# Patient Record
(demographics unavailable — no encounter records)

---

## 2024-10-09 NOTE — REVIEW OF SYSTEMS
[Fever] : no fever [Chills] : no chills [Feeling Poorly] : not feeling poorly [Feeling Tired] : not feeling tired [Eyesight Problems] : no eyesight problems [Discharge From Eyes] : no purulent discharge from the eyes [Nosebleeds] : no nosebleeds [Chest Pain] : no chest pain [Lower Ext Edema] : no extremity edema [Shortness Of Breath] : no shortness of breath [Wheezing] : no wheezing [Abdominal Pain] : no abdominal pain [Vomiting] : no vomiting [As Noted in HPI] : as noted in HPI [Itching] : no itching [Change In A Mole] : no change in a mole [Dizziness] : no dizziness [Fainting] : no fainting [Anxiety] : no anxiety [Depression] : no depression [Easy Bleeding] : no tendency for easy bleeding [Easy Bruising] : no tendency for easy bruising

## 2024-10-09 NOTE — ASSESSMENT
[FreeTextEntry1] : 82 years old man here for evaluation of an elevated creatinine.    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  There are no obstructive symptoms, no history of dm, nor uncontrolled hypertension.  There is no evidence of nephritis on urine analysis.  Vascular disease and prior contrast exposure with prior NSAIDS may be contributing.  Regardless his creatinine is stable at 1.4 in May 2024. Has some albuminuria and has a low KFRE we tried ARB but he did not tolerate it.   We spent most of the visit discussing chronic kidney disease, its stages, risk for progression and strategies for prevention including avoidance of NSAIDs and notifying me of medication changes.   Repeat urine tests today.  If stable follow up with me again six months  Hypertension -- The patient's blood pressure is at goal today. Continue present medications and monitor.  The time spent is inclusive of the time it took to see, evaluate, and manage the patient.  Time is inclusive of the time taken to review chart, reconcile medications, document findings, and communicate with other providers (when applicable).

## 2024-10-09 NOTE — HISTORY OF PRESENT ILLNESS
[FreeTextEntry1] : Today I had the pleasure of meeting Shayan Mills who is an semi retired .  He is originally from [place] but raise his children in [place].  His medical history has included a melanoma which was diagnosed at a young age, an irregular heart rhythm after a bout with coxsackie.  He was recently having some difficulty with fatigue and had a cardiac catheterization a few weeks back.  He is here for evaluation of an elevated creatinine.  He has no symptoms of urinary obstruction, no chest pain no shortness of breath, no hematuria.  No family history of kidney disease.  He does endorse a history of heavy NSAID use for muscle pain in the past.  7/22/21 - I saw and evaluated Shayan today.  He recently had an episode of HTN, fatigue, chills fever - covid was negative saw internist and self-resolved.  Creatinine with internist was 1.33 in June this year.  Patient feels well and denies complaints today.  12/13/21 -- Upon evaluation today Shayan feels well and denies complaints.  He has not had any difficulty with chest pain or shortness of breath.  Urination is not a problem.  Since our last visit no new meidcations and no recent blood work.  11/11/22 -- Upon evaluation today shayan feels well denies complaints.  has good energy level and no urinary complaints.  6/12/23 -- Patient feels well denies complaints.  just returned from 10 days in [place].   3/20/24 -- Doing well denies complaints no chest pain no difficulty breathing.  Having some MSK issues.  Avoiding NSAIDS uses topical salicylate which helps.  10/9/24 -- Doing well.  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  Patient has had slow urinary stream saw urology.  otherwise feels good.

## 2024-10-09 NOTE — PHYSICAL EXAM
[General Appearance - Alert] : alert [General Appearance - In No Acute Distress] : in no acute distress [General Appearance - Well Nourished] : well nourished [Sclera] : the sclera and conjunctiva were normal [Hearing Threshold Finger Rub Not [place]] : hearing was normal [Neck Appearance] : the appearance of the neck was normal [Jugular Venous Distention Increased] : there was no jugular-venous distention [] : no respiratory distress [Respiration, Rhythm And Depth] : normal respiratory rhythm and effort [Exaggerated Use Of Accessory Muscles For Inspiration] : no accessory muscle use [Auscultation Breath Sounds / Voice Sounds] : lungs were clear to auscultation bilaterally [Heart Sounds] : normal S1 and S2 [Heart Sounds Gallop] : no gallops [Murmurs] : no murmurs [Heart Sounds Pericardial Friction Rub] : no pericardial rub [Edema] : there was no peripheral edema [No CVA Tenderness] : no ~M costovertebral angle tenderness [Abnormal Walk] : normal gait [Oriented To Time, Place, And Person] : oriented to person, place, and time [Impaired Insight] : insight and judgment were intact [Affect] : the affect was normal [Mood] : the mood was normal

## 2024-10-30 NOTE — HISTORY OF PRESENT ILLNESS
[FreeTextEntry1] : 9/4/24 - This 83 yo male presents with c/o hesitancy and weakened FOS. He also informs of daytime dribbling - throughout the day - minimal amount daily. Denies enuresis.  He has noticed these changes just over the last 2-3 months. Denes any pelvic trauma.  He informs that he can hold his urine for 2-3 hours - no urgency. He has never had a full incontinence experience. He does feel as though there is not complete bladder emptying and sometimes, he double voids. Nocturia is 1-2x - informs that 2x is max. Daytime urination is every 3-4 hours - depends on water intake.  Informs that for the last 2-3 months he has also had a slight change in getting and maintaining an erection despite taking 100mg of Sildenafil.  Last urination - 2 hours ago Pre-urination scan: >123 mls PVR: 75ml PFR 13.2 on a voided vol. of 51ml  PMH: Renal insufficiency - sees nephrologist regularly  GFR 47 and CR 1.49 from May 2024.   PSH: Green light laser prostate procedure - 28 years ago - states was told his prostate was too large- does not remember urinary issues at that time and never maintained on any meds  10/30/24 - Returns now informing that double urination is still happening and that he is still experiencing minor overnight dribbling and that this moisture is irritating his inner left groin area. He denies redness or itching of the area and informs that the irritation is alleviating after he applies Vaseline.   He is maintained on Tadalafil 5 mg. which he only started on October 10th. Nocturia is x2 and daytime is 4-5. He drank 2 liters and coffee before this visit.   Pre-urination scan: 299ml PFR: 12.1 Voided Vol: 225ml PVR: 12.1

## 2025-04-10 NOTE — PHYSICAL EXAM
[General Appearance - Alert] : alert [General Appearance - In No Acute Distress] : in no acute distress [General Appearance - Well Nourished] : well nourished [Sclera] : the sclera and conjunctiva were normal [Hearing Threshold Finger Rub Not [place]] : hearing was normal [Neck Appearance] : the appearance of the neck was normal [Jugular Venous Distention Increased] : there was no jugular-venous distention [] : no respiratory distress [Respiration, Rhythm And Depth] : normal respiratory rhythm and effort [Exaggerated Use Of Accessory Muscles For Inspiration] : no accessory muscle use [Auscultation Breath Sounds / Voice Sounds] : lungs were clear to auscultation bilaterally [Heart Sounds] : normal S1 and S2 [Heart Sounds Gallop] : no gallops [Murmurs] : no murmurs [Heart Sounds Pericardial Friction Rub] : no pericardial rub [Edema] : there was no peripheral edema [No CVA Tenderness] : no ~M costovertebral angle tenderness [Abnormal Walk] : normal gait [Oriented To Time, Place, And Person] : oriented to person, place, and time [Impaired Insight] : insight and judgment were intact [Affect] : the affect was normal [Mood] : the mood was normal

## 2025-04-10 NOTE — ASSESSMENT
[FreeTextEntry1] : 82 years old man here for evaluation of an elevated creatinine.    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  There are no obstructive symptoms, no history of dm, nor uncontrolled hypertension.  There is no evidence of nephritis on urine analysis.  Vascular disease and prior contrast exposure with prior NSAIDS may be contributing.  Regardless his creatinine is stable at 1.3 on recent blood work (done at outside lab will scan in). Has some albuminuria and has a low KFRE we tried ARB but he did not tolerate it.   We spent most of the visit discussing chronic kidney disease, its stages, risk for progression and strategies for prevention including avoidance of NSAIDs and notifying me of medication changes.   Six months follow up.  Hypertension -- Long discussion today about BP, BP goals and the importance of maintaining those goals for CV risk reduction.    The time spent is inclusive of the time it took to see, evaluate, and manage the patient.  Time is inclusive of the time taken to review chart, reconcile medications, document findings, and communicate with other providers (when applicable).

## 2025-04-10 NOTE — HISTORY OF PRESENT ILLNESS
[FreeTextEntry1] : Today I had the pleasure of meeting Shayan Mills who is an semi retired .  He is originally from [place] but raise his children in [place].  His medical history has included a melanoma which was diagnosed at a young age, an irregular heart rhythm after a bout with coxsackie.  He was recently having some difficulty with fatigue and had a cardiac catheterization a few weeks back.  He is here for evaluation of an elevated creatinine.  He has no symptoms of urinary obstruction, no chest pain no shortness of breath, no hematuria.  No family history of kidney disease.  He does endorse a history of heavy NSAID use for muscle pain in the past.  7/22/21 - I saw and evaluated Shayan today.  He recently had an episode of HTN, fatigue, chills fever - covid was negative saw internist and self-resolved.  Creatinine with internist was 1.33 in June this year.  Patient feels well and denies complaints today.  12/13/21 -- Upon evaluation today Shayan feels well and denies complaints.  He has not had any difficulty with chest pain or shortness of breath.  Urination is not a problem.  Since our last visit no new meidcations and no recent blood work.  11/11/22 -- Upon evaluation today shayan feels well denies complaints.  has good energy level and no urinary complaints.  6/12/23 -- Patient feels well denies complaints.  just returned from 10 days in [place].   3/20/24 -- Doing well denies complaints no chest pain no difficulty breathing.  Having some MSK issues.  Avoiding NSAIDS uses topical salicylate which helps.  10/9/24 -- Doing well.  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  Patient has had slow urinary stream saw urology.  otherwise feels good.    4/10/25 -- Doing well no complaints.  no chest pain no shortness of breath.  feels ok overall.

## 2025-05-06 NOTE — DISCUSSION/SUMMARY
[FreeTextEntry1] : 77y/oM - Former patient of Dr. Travis - h.o. T1 Melanoma-Locally Resected with no recurrence, HL on ASA/Atorva, PSVT/APCs treated with a BB - palpitations and a FHX early heart disease (normal NMR lipid profile and Lp (a)) except a low HDL, importantly pprior large effusion now resolved, prior pericarditis now resolving, but with some possibility of constriction on final TTE (and MRI) - p/f f/u evaluation ----------------- =========== TTE --  - LVEF  Nl CTA [place] -  - No obstructive disease  TTE - 10 -2019 - LVEF nl; normal echo  EKG Stress -  -Normal but borderline ~ 1mm ST Horizontal Depressions, but Alarcon Score > 5 indicating low risk  TTE -  - Thick pericardium; Mild MR; No Evidence of Constriction although transvalvular respiratory variation not measured.  TTE - 2017 - Fibrinous material pericardial space; 25% respiratory variation suggestive of constrictive physiology; Small effusion; Normal LVEF ============= -------------------- CP/Vasovagal episode/Abnomrla Stress Test  -Monitor   1. Pericardial Effusion/Pericarditis (with Positive Cocksackie A)/h.o. Melanoma - Issue decidedly resolved  - Likely a pleuro-pericarditis following a flu-like illness contracted on recent trip, particularly given now asymptomatic and resolved - Concerning is that the echo demonstrates a septal bounce, possibly c/w a constrictive sequellae  - Off Ciolschicine - The history of localized melanoma would be concerning for metastatic disease, as it commonly metastasizes to the myocardium and pericardium; however, symptoms and effusion should likely not have resolved this rapidly; but will monitor  - WCheck TTE in 1 yeatr (had prior MRI a year ago) - Issue resolved  2. PSVT/APCs - Continue BB Toprol 200  3. HL - Continue Atorva.  - Check bloodwork               [EKG obtained to assist in diagnosis and management of assessed problem(s)] : EKG obtained to assist in diagnosis and management of assessed problem(s)

## 2025-05-06 NOTE — HISTORY OF PRESENT ILLNESS
[FreeTextEntry1] :  Perioperative Recommendations (and cardiac clearance) for LANDEN CHAUDHRY for dermatologic excision procedure  - EKG without ischemic changes - Patient is asymptomatic and stable from a cardiac standpoint.  - LANDEN requires no further cardiac testing prior to procedure and may proceed from cardiac standpoint. - To Remain on ASA throughout if possible  Jason Pulido MD    79y/oM - - h.o. T1 Melanoma-Locally Resected with no recurrence, HL on ASA/Atorva, PSVT/APCs treated with a BB - Palpitations and a FHX early heart disease (normal NMR lipid profile and Lp (a)) except a low HDL, importantly prior pericarditis (with +C[place]ackie A) now resolved, but with resollved pericardial effusion, some possibility of constriction on final TTE (and MRI), vasovagal - p/f f/u evaluation ------------------ ------------------ ============ 03/2017 He recently returned from a Cruise in the [facility] and called me after presenting to his PCP with symptoms of a hacking cough. Apparently, he was empirically prescribed a Z-Pack. As his symptoms failed to improve, he was given Prednisone and underwent a Chest CT.  Chest CT revealed a moderate to large, circumferential pericardial effusion.  He now presents for further evaluation. States symptoms have improved significantly with Prednisone.  I ordered an echo 1 week ago, which demonstrated will demonstrated a moderate to large-pericardial effusion with no evidence of tamponade physiology.   I planned to observe with a repeat echo today. Repeat echo with smaller pericardial effusion, but a septal bounce with the resolving effusion.  Notably patient has a h.o. of a T1 Locally-Resected Melanoma on his back with clean margins.  ------------------- 05/2017 Reports feeling  Back to 100%  4 Flights stairs, no CP/noSOB/no Palps.  Also exercising  -------------------- 09/2017 Still active. No issues.  Running daily.  MRI with very subtle septal bounce indicative of old pericardial inflammation/scar without attributable sxs --------------------- 03/2018 On last visit, Colchicine was discontinued.  Continued on Toprol 200XL for PSVT. ECG with marked sinus bradycardia.  Has new grandson.  Feeling well.  --------------------- 09/2018 Continuing on Toprol 200 with marked bradycardia. Now improved Lipids excellent on last visit. Cr well. PSVT/Hyperlipidemia/Chronic Pericarditis - controlled on current treatment, No CP/SOB/Palps/LE Edema/Orthopnea attributable to these medical issues. Labs reviewed in EMR. ------------------- 03/2019 Now doing [employer] in [place], [place]. Doing cross fit and swimming.  Discussed DC ASA after various artricles.  Dogs and walking.  PSVT/Hyperlipidemia/Chronic Pericarditis - controlled on current treatment, No CP/SOB/Palps/LE Edema/Orthopnea attributable to these medical issues. Labs reviewed in EMR. --------------------  Describes episodes of flush/sweating.  Vasovagal sounding.  Continues to walk.  ----------------------  TTE - 10 -2019 - LVEF nl; normal echo  EKG Stress -  -Normal but borderline ~ 1mm ST Horizontal Depressions, but Alarcon Score > 5 indicating low risk  ------------------------  Doing well. Some mild venous insufficiency.  ----------------------------  3 vasovagal episodes in a row (without any the prior 6 months) Stil on Toprol 200 XL  Compression sockings +CP -> ..Stress Test, Echo.  Abnormal Stress Sending for R/LHC for constriction/coronary disease -----------------------------  He consulted with Loc Austin at [facility] who suggested he get a CTA and follow-up with me. I encouraged him to have a second opinion ------------------------------ 1-2021 CTA -  - Non-obstructive; 1400 AU ------------------------------- 3-2021 Doing excellent climbing stairs Checking labs.  Toprol 50XL  - No 150mg dose and 200 mg too much for him --------------------------------  Toprol 100 XL Crestor 40 Walking. 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[facility] ----------------- -----------------  Now retired; no more properties grandchildren in [place] and [place]  CTA - PREVIOUSLY  - Non-obstructive; 1400 AU Want cholesterol down ----------------- -----------------  TTE --  - LVEF  Nl BW Great ----------------- -----------------  Some issues with impotence and GI issues and fatigue when started Valsartan and came down on Metoprolol  ----------------- -----------------  Off Valsartan Cr stable  ----------------- ----------------- ---------------------------------------------------------------------------- ---------------------------------------------------------------------------- 5-2025 CC: Heart Issues - Patient reports no chest pain, no localization to the sternum, no radiation to the neck/jaw, no alleviating nor worsening precipitants to CP, no assoc symptoms to CP - Patient notes no associated SOB/Palps/Leg swelling - Reports No associated F/C/N/V/Headaches - Reports Normal Exercise Tolerance - Reports no medication changes - Reports normal mood/quality of life - Reports no associated midnight awakenings from cP - Reports no diet changes - Reports no associated body aches- Reports no recent colds/viruses- Recent labs/imaging reviewed- Relevant Family history reviewed Mother/Father - CVRisk Assessment for 10 Year ACC/AHA Pooled Risk Cohort Equation places this person at < 7.5% Risk of ASCVD TTE -- 5-2025 - LVEF  Nl; Septal bounce   ------------------ Data:  MRI 08/2017 Normal biV function Subtle Septal bounce

## 2025-05-06 NOTE — PHYSICAL EXAM
[Well Developed] : well developed [Well Nourished] : well nourished [No Acute Distress] : no acute distress [Normal Venous Pressure] : normal venous pressure [No Carotid Bruit] : no carotid bruit [Normal S1, S2] : normal S1, S2 [No Murmur] : no murmur [No Rub] : no rub [No Gallop] : no gallop [Clear Lung Fields] : clear lung fields [Good Air Entry] : good air entry [No Respiratory Distress] : no respiratory distress  [Soft] : abdomen soft [Non Tender] : non-tender [No Masses/organomegaly] : no masses/organomegaly [Normal Bowel Sounds] : normal bowel sounds [Normal Gait] : normal gait [No Edema] : no edema [No Cyanosis] : no cyanosis [No Clubbing] : no clubbing [No Varicosities] : no varicosities [No Rash] : no rash [No Skin Lesions] : no skin lesions [Moves all extremities] : moves all extremities [No Focal Deficits] : no focal deficits [Normal Speech] : normal speech [Alert and Oriented] : alert and oriented [Normal memory] : normal memory [General Appearance - Well Developed] : well developed [Normal Appearance] : normal appearance [Well Groomed] : well groomed [General Appearance - Well Nourished] : well nourished [No Deformities] : no deformities [General Appearance - In No Acute Distress] : no acute distress [Normal Conjunctiva] : the conjunctiva exhibited no abnormalities [Eyelids - No Xanthelasma] : the eyelids demonstrated no xanthelasmas [Normal Oral Mucosa] : normal oral mucosa [No Oral Pallor] : no oral pallor [No Oral Cyanosis] : no oral cyanosis [Normal Jugular Venous A Waves Present] : normal jugular venous A waves present [Normal Jugular Venous V Waves Present] : normal jugular venous V waves present [No Jugular Venous [place] A Waves] : no jugular venous ortiz A waves [Respiration, Rhythm And Depth] : normal respiratory rhythm and effort [Auscultation Breath Sounds / Voice Sounds] : lungs were clear to auscultation bilaterally [Exaggerated Use Of Accessory Muscles For Inspiration] : no accessory muscle use [Heart Sounds] : normal S1 and S2 [Heart Rate And Rhythm] : heart rate and rhythm were normal [Murmurs] : no murmurs present [Edema] : no peripheral edema present [Abdomen Soft] : soft [Abdomen Tenderness] : non-tender [Abdomen Mass (___ Cm)] : no abdominal mass palpated [Abnormal Walk] : normal gait [Gait - Sufficient For Exercise Testing] : the gait was sufficient for exercise testing [Nail Clubbing] : no clubbing of the fingernails [Petechial Hemorrhages (___cm)] : no petechial hemorrhages [Cyanosis, Localized] : no localized cyanosis [Skin Color & Pigmentation] : normal skin color and pigmentation [] : no rash [No Venous Stasis] : no venous stasis [Skin Lesions] : no skin lesions [No Skin Ulcers] : no skin ulcer [No Xanthoma] : no  xanthoma was observed [Oriented To Time, Place, And Person] : oriented to person, place, and time [Affect] : the affect was normal [Mood] : the mood was normal [No Anxiety] : not feeling anxious [FreeTextEntry1] : no kussmals, no pericardial knock

## 2025-05-06 NOTE — PHYSICAL EXAM
[Well Developed] : well developed [Well Nourished] : well nourished [No Acute Distress] : no acute distress [Normal Venous Pressure] : normal venous pressure [No Carotid Bruit] : no carotid bruit [Normal S1, S2] : normal S1, S2 [No Murmur] : no murmur [No Rub] : no rub [No Gallop] : no gallop [Clear Lung Fields] : clear lung fields [Good Air Entry] : good air entry [No Respiratory Distress] : no respiratory distress  [Soft] : abdomen soft [Non Tender] : non-tender [No Masses/organomegaly] : no masses/organomegaly [Normal Bowel Sounds] : normal bowel sounds [Normal Gait] : normal gait [No Edema] : no edema [No Cyanosis] : no cyanosis [No Clubbing] : no clubbing [No Varicosities] : no varicosities [No Rash] : no rash [No Skin Lesions] : no skin lesions [Moves all extremities] : moves all extremities [No Focal Deficits] : no focal deficits [Normal Speech] : normal speech [Alert and Oriented] : alert and oriented [Normal memory] : normal memory [General Appearance - Well Developed] : well developed [Normal Appearance] : normal appearance [Well Groomed] : well groomed [No Deformities] : no deformities [General Appearance - Well Nourished] : well nourished [General Appearance - In No Acute Distress] : no acute distress [Normal Conjunctiva] : the conjunctiva exhibited no abnormalities [Eyelids - No Xanthelasma] : the eyelids demonstrated no xanthelasmas [Normal Oral Mucosa] : normal oral mucosa [No Oral Pallor] : no oral pallor [No Oral Cyanosis] : no oral cyanosis [Normal Jugular Venous A Waves Present] : normal jugular venous A waves present [Normal Jugular Venous V Waves Present] : normal jugular venous V waves present [No Jugular Venous Ortiz A Waves] : no jugular venous ortiz A waves [Respiration, Rhythm And Depth] : normal respiratory rhythm and effort [Exaggerated Use Of Accessory Muscles For Inspiration] : no accessory muscle use [Auscultation Breath Sounds / Voice Sounds] : lungs were clear to auscultation bilaterally [Heart Rate And Rhythm] : heart rate and rhythm were normal [Heart Sounds] : normal S1 and S2 [Murmurs] : no murmurs present [Edema] : no peripheral edema present [Abdomen Soft] : soft [Abdomen Tenderness] : non-tender [Abdomen Mass (___ Cm)] : no abdominal mass palpated [Abnormal Walk] : normal gait [Gait - Sufficient For Exercise Testing] : the gait was sufficient for exercise testing [Nail Clubbing] : no clubbing of the fingernails [Petechial Hemorrhages (___cm)] : no petechial hemorrhages [Cyanosis, Localized] : no localized cyanosis [Skin Color & Pigmentation] : normal skin color and pigmentation [] : no rash [No Venous Stasis] : no venous stasis [Skin Lesions] : no skin lesions [No Skin Ulcers] : no skin ulcer [No Xanthoma] : no  xanthoma was observed [Oriented To Time, Place, And Person] : oriented to person, place, and time [Affect] : the affect was normal [Mood] : the mood was normal [No Anxiety] : not feeling anxious [FreeTextEntry1] : no kussmals, no pericardial knock

## 2025-05-06 NOTE — HISTORY OF PRESENT ILLNESS
[FreeTextEntry1] :  Perioperative Recommendations (and cardiac clearance) for LANDEN CHAUDHRY for dermatologic excision procedure  - EKG without ischemic changes - Patient is asymptomatic and stable from a cardiac standpoint.  - LANDEN requires no further cardiac testing prior to procedure and may proceed from cardiac standpoint. - To Remain on ASA throughout if possible  Jason Pulido MD    79y/oM - - h.o. T1 Melanoma-Locally Resected with no recurrence, HL on ASA/Atorva, PSVT/APCs treated with a BB - Palpitations and a FHX early heart disease (normal NMR lipid profile and Lp (a)) except a low HDL, importantly prior pericarditis (with +Cocksackie A) now resolved, but with resollved pericardial effusion, some possibility of constriction on final TTE (and MRI), vasovagal - p/f f/u evaluation ------------------ ------------------ ============ 03/2017 He recently returned from a Cruise in the [facility] and called me after presenting to his PCP with symptoms of a hacking cough. Apparently, he was empirically prescribed a Z-Pack. As his symptoms failed to improve, he was given Prednisone and underwent a Chest CT.  Chest CT revealed a moderate to large, circumferential pericardial effusion.  He now presents for further evaluation. States symptoms have improved significantly with Prednisone.  I ordered an echo 1 week ago, which demonstrated will demonstrated a moderate to large-pericardial effusion with no evidence of tamponade physiology.   I planned to observe with a repeat echo today. Repeat echo with smaller pericardial effusion, but a septal bounce with the resolving effusion.  Notably patient has a h.o. of a T1 Locally-Resected Melanoma on his back with clean margins.  ------------------- 05/2017 Reports feeling  Back to 100%  4 Flights stairs, no CP/noSOB/no Palps.  Also exercising  -------------------- 09/2017 Still active. No issues.  Running daily.  MRI with very subtle septal bounce indicative of old pericardial inflammation/scar without attributable sxs --------------------- 03/2018 On last visit, Colchicine was discontinued.  Continued on Toprol 200XL for PSVT. ECG with marked sinus bradycardia.  Has new grandson.  Feeling well.  --------------------- 09/2018 Continuing on Toprol 200 with marked bradycardia. Now improved Lipids excellent on last visit. Cr well. PSVT/Hyperlipidemia/Chronic Pericarditis - controlled on current treatment, No CP/SOB/Palps/LE Edema/Orthopnea attributable to these medical issues. Labs reviewed in EMR. ------------------- 03/2019 Now doing [employer] in [place], [place]. Doing cross fit and swimming.  Discussed DC ASA after various artricles.  Dogs and walking.  PSVT/Hyperlipidemia/Chronic Pericarditis - controlled on current treatment, No CP/SOB/Palps/LE Edema/Orthopnea attributable to these medical issues. Labs reviewed in EMR. --------------------  Describes episodes of flush/sweating.  Vasovagal sounding.  Continues to walk.  ----------------------  TTE - 10 -2019 - LVEF nl; normal echo  EKG Stress -  -Normal but borderline ~ 1mm ST Horizontal Depressions, but Alarcon Score > 5 indicating low risk  ------------------------  Doing well. Some mild venous insufficiency.  ----------------------------  3 vasovagal episodes in a row (without any the prior 6 months) Stil on Toprol 200 XL  Compression sockings +CP -> ..Stress Test, Echo.  Abnormal Stress Sending for R/LHC for constriction/coronary disease -----------------------------  He consulted with Loc Austin at [facility] who suggested he get a CTA and follow-up with me. I encouraged him to have a second opinion ------------------------------ 1-2021 CTA -  - Non-obstructive; 1400 AU ------------------------------- 3-2021 Doing excellent climbing stairs Checking labs.  Toprol 50XL  - No 150mg dose and 200 mg too much for him --------------------------------  Toprol 100 XL Crestor 40 Walking. 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[facility] ----------------- -----------------  Now retired; no more properties grandchildren in [place] and [place]  CTA - PREVIOUSLY  - Non-obstructive; 1400 AU Want cholesterol down ----------------- -----------------  TTE --  - LVEF  Nl BW Great ----------------- -----------------  Some issues with impotence and GI issues and fatigue when started Valsartan and came down on Metoprolol  ----------------- -----------------  Off Valsartan Cr stable  ----------------- ----------------- ---------------------------------------------------------------------------- ---------------------------------------------------------------------------- 5-2025 CC: Heart Issues - Patient reports no chest pain, no localization to the sternum, no radiation to the neck/jaw, no alleviating nor worsening precipitants to CP, no assoc symptoms to CP - Patient notes no associated SOB/Palps/Leg swelling - Reports No associated F/C/N/V/Headaches - Reports Normal Exercise Tolerance - Reports no medication changes - Reports normal mood/quality of life - Reports no associated midnight awakenings from cP - Reports no diet changes - Reports no associated body aches- Reports no recent colds/viruses- Recent labs/imaging reviewed- Relevant Family history reviewed Mother/Father - CVRisk Assessment for 10 Year ACC/AHA Pooled Risk Cohort Equation places this person at < 7.5% Risk of ASCVD TTE -- 5-2025 - LVEF  Nl; Septal bounce   ------------------ Data:  MRI 08/2017 Normal biV function Subtle Septal bounce

## 2025-07-02 NOTE — DISCUSSION/SUMMARY
[FreeTextEntry1] : 77y/oM - Former patient of Dr. Travis - h.o. T1 Melanoma-Locally Resected with no recurrence, HL on ASA/Atorva, PSVT/APCs treated with a BB - palpitations and a FHX early heart disease (normal NMR lipid profile and Lp (a)) except a low HDL, importantly pprior large effusion now resolved, prior pericarditis now resolving, but with some possibility of constriction on final TTE (and MRI) - p/f f/u evaluation ----------------- =========== TTE --  - LVEF  Nl CTA [place] -  - No obstructive disease  TTE - 10 -2019 - LVEF nl; normal echo  EKG Stress -  -Normal but borderline ~ 1mm ST Horizontal Depressions, but Alarcon Score > 5 indicating low risk  TTE -  - Thick pericardium; Mild MR; No Evidence of Constriction although transvalvular respiratory variation not measured.  TTE - 2017 - Fibrinous material pericardial space; 25% respiratory variation suggestive of constrictive physiology; Small effusion; Normal LVEF ============= -------------------- CP/Vasovagal episode/Abnomrla Stress Test  -Monitor   1. Pericardial Effusion/Pericarditis (with Positive Cocksackie A)/h.o. Melanoma - Issue decidedly resolved  - Likely a pleuro-pericarditis following a flu-like illness contracted on recent trip, particularly given now asymptomatic and resolved - Concerning is that the echo demonstrates a septal bounce, possibly c/w a constrictive sequellae  - Off Ciolschicine - The history of localized melanoma would be concerning for metastatic disease, as it commonly metastasizes to the myocardium and pericardium; however, symptoms and effusion should likely not have resolved this rapidly; but will monitor  - WCheck TTE in 1 yeatr (had prior MRI a year ago) - Issue resolved  2. PSVT/APCs - Continue BB Toprol 200  3. HL - Continue Atorva.  - Check bloodwork

## 2025-07-02 NOTE — HISTORY OF PRESENT ILLNESS
[FreeTextEntry1] :  Perioperative Recommendations (and cardiac clearance) for LANDEN CHAUDHRY for dermatologic excision procedure  - EKG without ischemic changes - Patient is asymptomatic and stable from a cardiac standpoint.  - LANDEN requires no further cardiac testing prior to procedure and may proceed from cardiac standpoint. - To Remain on ASA throughout if possible  Jason Pulido MD    79y/oM - - h.o. T1 Melanoma-Locally Resected with no recurrence, HL on ASA/Atorva, PSVT/APCs treated with a BB - Palpitations and a FHX early heart disease (normal NMR lipid profile and Lp (a)) except a low HDL, importantly prior pericarditis (with +Cocksackie A) now resolved, but with resollved pericardial effusion, some possibility of constriction on final TTE (and MRI), vasovagal - p/f f/u evaluation ------------------ ------------------ ============ 03/2017 He recently returned from a Cruise in the [facility] and called me after presenting to his PCP with symptoms of a hacking cough. Apparently, he was empirically prescribed a Z-Pack. As his symptoms failed to improve, he was given Prednisone and underwent a Chest CT.  Chest CT revealed a moderate to large, circumferential pericardial effusion.  He now presents for further evaluation. States symptoms have improved significantly with Prednisone.  I ordered an echo 1 week ago, which demonstrated will demonstrated a moderate to large-pericardial effusion with no evidence of tamponade physiology.   I planned to observe with a repeat echo today. Repeat echo with smaller pericardial effusion, but a septal bounce with the resolving effusion.  Notably patient has a h.o. of a T1 Locally-Resected Melanoma on his back with clean margins.  ------------------- 05/2017 Reports feeling  Back to 100%  4 Flights stairs, no CP/noSOB/no Palps.  Also exercising  -------------------- 09/2017 Still active. No issues.  Running daily.  MRI with very subtle septal bounce indicative of old pericardial inflammation/scar without attributable sxs --------------------- 03/2018 On last visit, Colchicine was discontinued.  Continued on Toprol 200XL for PSVT. ECG with marked sinus bradycardia.  Has new grandson.  Feeling well.  --------------------- 09/2018 Continuing on Toprol 200 with marked bradycardia. Now improved Lipids excellent on last visit. Cr well. PSVT/Hyperlipidemia/Chronic Pericarditis - controlled on current treatment, No CP/SOB/Palps/LE Edema/Orthopnea attributable to these medical issues. Labs reviewed in EMR. ------------------- 03/2019 Now doing [employer] in [place], [place]. Doing cross fit and swimming.  Discussed DC ASA after various artricles.  Dogs and walking.  PSVT/Hyperlipidemia/Chronic Pericarditis - controlled on current treatment, No CP/SOB/Palps/LE Edema/Orthopnea attributable to these medical issues. Labs reviewed in EMR. --------------------  Describes episodes of flush/sweating.  Vasovagal sounding.  Continues to walk.  ----------------------  TTE - 10 -2019 - LVEF nl; normal echo  EKG Stress -  -Normal but borderline ~ 1mm ST Horizontal Depressions, but Alarcon Score > 5 indicating low risk  ------------------------  Doing well. Some mild venous insufficiency.  ----------------------------  3 vasovagal episodes in a row (without any the prior 6 months) Stil on Toprol 200 XL  Compression sockings +CP -> ..Stress Test, Echo.  Abnormal Stress Sending for R/LHC for constriction/coronary disease -----------------------------  He consulted with Loc Austin at [facility] who suggested he get a CTA and follow-up with me. I encouraged him to have a second opinion ------------------------------ 1-2021 CTA -  - Non-obstructive; 1400 AU ------------------------------- 3-2021 Doing excellent climbing stairs Checking labs.  Toprol 50XL  - No 150mg dose and 200 mg too much for him --------------------------------  Toprol 100 XL Crestor 40 Walking. 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 properties grandchildren in [place] and [place]  CTA - PREVIOUSLY  - Non-obstructive; 1400 AU Want cholesterol down ----------------- -----------------  TTE --  - LVEF  Nl BW Great ----------------- -----------------  Some issues with impotence and GI issues and fatigue when started Valsartan and came down on Metoprolol  ----------------- -----------------  Off Valsartan Cr stable  ----------------- ----------------- ---------------------------------------------------------------------------- ---------------------------------------------------------------------------- 7-2025 CC: Heart Issues - Patient reports no chest pain, no localization to the sternum, no radiation to the neck/jaw, no alleviating nor worsening precipitants to CP, no assoc symptoms to CP - Patient notes no associated SOB/Palps/Leg swelling - Reports No associated F/C/N/V/Headaches - Reports Normal Exercise Tolerance - Reports no medication changes - Reports normal mood/quality of life - Reports no associated midnight awakenings from cP - Reports no diet changes - Reports no associated body aches- Reports no recent colds/viruses- Recent labs/imaging reviewed- Relevant Family history reviewed Mother/Father - CVRisk Assessment for 10 Year ACC/AHA Pooled Risk Cohort Equation places this person at < 7.5% Risk of ASCVD CTA -  - Non-obstructive; 1400 AU TTE -- 5-2025 - LVEF  Nl; Septal bounce Vasovagal   ------------------ Data:  MRI 08/2017 Normal biV function Subtle Septal bounce

## 2025-07-02 NOTE — PHYSICAL EXAM
[Well Developed] : well developed [Well Nourished] : well nourished [No Acute Distress] : no acute distress [Normal Venous Pressure] : normal venous pressure [No Carotid Bruit] : no carotid bruit [Normal S1, S2] : normal S1, S2 [No Murmur] : no murmur [No Rub] : no rub [No Gallop] : no gallop [Clear Lung Fields] : clear lung fields [Good Air Entry] : good air entry [No Respiratory Distress] : no respiratory distress  [Soft] : abdomen soft [Non Tender] : non-tender [No Masses/organomegaly] : no masses/organomegaly [Normal Bowel Sounds] : normal bowel sounds [Normal Gait] : normal gait [No Edema] : no edema [No Cyanosis] : no cyanosis [No Clubbing] : no clubbing [No Varicosities] : no varicosities [No Rash] : no rash [No Skin Lesions] : no skin lesions [Moves all extremities] : moves all extremities [No Focal Deficits] : no focal deficits [Normal Speech] : normal speech [Alert and Oriented] : alert and oriented [Normal memory] : normal memory [General Appearance - Well Developed] : well developed [Normal Appearance] : normal appearance [Well Groomed] : well groomed [General Appearance - Well Nourished] : well nourished [No Deformities] : no deformities [General Appearance - In No Acute Distress] : no acute distress [Normal Conjunctiva] : the conjunctiva exhibited no abnormalities [Eyelids - No Xanthelasma] : the eyelids demonstrated no xanthelasmas [Normal Oral Mucosa] : normal oral mucosa [No Oral Pallor] : no oral pallor [No Oral Cyanosis] : no oral cyanosis [Normal Jugular Venous A Waves Present] : normal jugular venous A waves present [Normal Jugular Venous V Waves Present] : normal jugular venous V waves present [No Jugular Venous Ortiz A Waves] : no jugular venous ortiz A waves [Respiration, Rhythm And Depth] : normal respiratory rhythm and effort [Exaggerated Use Of Accessory Muscles For Inspiration] : no accessory muscle use [Auscultation Breath Sounds / Voice Sounds] : lungs were clear to auscultation bilaterally [Heart Rate And Rhythm] : heart rate and rhythm were normal [Heart Sounds] : normal S1 and S2 [Murmurs] : no murmurs present [Edema] : no peripheral edema present [Abdomen Soft] : soft [Abdomen Tenderness] : non-tender [Abdomen Mass (___ Cm)] : no abdominal mass palpated [Abnormal Walk] : normal gait [Gait - Sufficient For Exercise Testing] : the gait was sufficient for exercise testing [Nail Clubbing] : no clubbing of the fingernails [Cyanosis, Localized] : no localized cyanosis [Petechial Hemorrhages (___cm)] : no petechial hemorrhages [Skin Color & Pigmentation] : normal skin color and pigmentation [] : no rash [No Venous Stasis] : no venous stasis [Skin Lesions] : no skin lesions [No Skin Ulcers] : no skin ulcer [No Xanthoma] : no  xanthoma was observed [Oriented To Time, Place, And Person] : oriented to person, place, and time [Affect] : the affect was normal [Mood] : the mood was normal [No Anxiety] : not feeling anxious [FreeTextEntry1] : no kussmals, no pericardial knock